# Patient Record
(demographics unavailable — no encounter records)

---

## 2025-03-25 NOTE — HISTORY OF PRESENT ILLNESS
[FreeTextEntry1] : 78 year old male with PMHX of CAD (  pRCA 12/2015), CM ( EF 50% Cath 2015, recent EF 60%), HLD, MVP, lymphoma ( 10/2021 )  and thoracic aortic ectasia here for follow up and AAA scan.  Overall feeling well with no acute concerns.   He remains without chest pains but he still gets out of breath walking up hills or two flights of stairs. This has been pretty stable. He has no edema. He has no recent illnesses or bleeding issues.   Patient denies any palpitations, shortness of breath at rest, dizziness, falls, syncope or neuro focal deficits.  He currently is in between Primary Care Providers. No recent labs were done. He did eat prior to coming in today.   Previous Work Up LABS ( 07/19/2023 ) H/H 13.6/41.0, A1c 5.6, , , HDL 52, LDL 53 Labs ( 5/2022 ) Total Chol 164, HDL 49, LDL 63, Cr 0.85, GFR 91, K4.3  ECHO ( 09/24/2024 )  1. Left ventricular wall thickness is normal. Left ventricular systolic function is normal with an ejection fraction visually estimated at 65 to 70 %. There are no regional wall motion abnormalities seen. 2. There is mild (grade 1) left ventricular diastolic dysfunction. 3. Normal right ventricular cavity size, with normal wall thickness, and normal right ventricular systolic function. 4. Mild mitral regurgitation. 5. No pericardial effusion seen. 6. Aortic root at the sinuses of Valsalva is dilated. 7. Prolapse of the posterior mitral leaflet.  Stress ECHO ( 03/2023 )  1. Treadmill exercise Stress Echo Test. 2. Normal stress echocardiogram with normal augmentation of left ventricular systolic function. 3. No evidence of induced ischemia.

## 2025-03-25 NOTE — REVIEW OF SYSTEMS
[Dyspnea on exertion] : dyspnea during exertion [Fever] : no fever [Chills] : no chills [SOB] : no shortness of breath [Chest Discomfort] : no chest discomfort [Lower Ext Edema] : no extremity edema [Palpitations] : no palpitations [Syncope] : no syncope [Cough] : no cough [Nausea] : no nausea [Vomiting] : no vomiting [Diarrhea] : diarrhea [Dizziness] : no dizziness [Numbness (Hypoesthesia)] : no numbness [Weakness] : no weakness [Speech Disturbance] : no speech disturbance [Easy Bleeding] : no tendency for easy bleeding

## 2025-03-25 NOTE — DISCUSSION/SUMMARY
[EKG obtained to assist in diagnosis and management of assessed problem(s)] : EKG obtained to assist in diagnosis and management of assessed problem(s) [FreeTextEntry1] : 78 year old male with PMHX of CAD (  pRCA 12/2015), CM ( EF 50% Cath 2015, recent EF 60%), HLD, MVP, lymphoma ( 10/2021 ) and thoracic aortic ectasia here for follow up and AAA scan.  EKG SR RBBB. low voltage, left fascicular block, no change.   CAD:  - Dyspnea noted walking up hills.  - Will have him return for stress echocardiogram to rule out CAD  HLD: - LDL goal < 70  - Labs sent today - Continue with Atorvastatin 80mg for now, add zetia if needed  Thoracic Aortic Ectasia:  - Last AO 4.1cm on echo - AAA scan done today.  - Continue with blood pressure control.   Follow up in 6 months  stress echo.  I have discussed the case with [facility]. I have personally performed a history, physical exam, and my own medical decision making. I have reviewed the note and agree with the findings and plan.   The patient has a history of coronary artery disease.  He has mild dyspnea on exertion.  EKG shows normal sinus rhythm, right bundle branch block, low voltage, possible anterior wall MI.  The patient is at risk for ischemia.  He will return for stress echocardiogram.  The patient has a history of a dilated aortic root.  He is at risk for an abdominal aneurysm.  The abdominal sonogram is normal.  His cholesterol has been good.  He will continue rosuvastatin.